# Patient Record
Sex: MALE | Race: WHITE | NOT HISPANIC OR LATINO | Employment: OTHER | URBAN - METROPOLITAN AREA
[De-identification: names, ages, dates, MRNs, and addresses within clinical notes are randomized per-mention and may not be internally consistent; named-entity substitution may affect disease eponyms.]

---

## 2023-12-10 ENCOUNTER — HOSPITAL ENCOUNTER (EMERGENCY)
Facility: HOSPITAL | Age: 88
Discharge: HOME | End: 2023-12-10
Attending: STUDENT IN AN ORGANIZED HEALTH CARE EDUCATION/TRAINING PROGRAM
Payer: MEDICARE

## 2023-12-10 ENCOUNTER — APPOINTMENT (OUTPATIENT)
Dept: RADIOLOGY | Facility: HOSPITAL | Age: 88
End: 2023-12-10
Payer: MEDICARE

## 2023-12-10 VITALS
BODY MASS INDEX: 27.83 KG/M2 | RESPIRATION RATE: 18 BRPM | HEART RATE: 90 BPM | SYSTOLIC BLOOD PRESSURE: 142 MMHG | HEIGHT: 73 IN | OXYGEN SATURATION: 94 % | TEMPERATURE: 99 F | DIASTOLIC BLOOD PRESSURE: 98 MMHG | WEIGHT: 210 LBS

## 2023-12-10 DIAGNOSIS — U07.1 COVID-19: Primary | ICD-10-CM

## 2023-12-10 LAB
FLUAV RNA RESP QL NAA+PROBE: NOT DETECTED
FLUBV RNA RESP QL NAA+PROBE: NOT DETECTED
RSV RNA RESP QL NAA+PROBE: NOT DETECTED
SARS-COV-2 RNA RESP QL NAA+PROBE: DETECTED

## 2023-12-10 PROCEDURE — 71045 X-RAY EXAM CHEST 1 VIEW: CPT | Performed by: RADIOLOGY

## 2023-12-10 PROCEDURE — 87634 RSV DNA/RNA AMP PROBE: CPT | Performed by: STUDENT IN AN ORGANIZED HEALTH CARE EDUCATION/TRAINING PROGRAM

## 2023-12-10 PROCEDURE — A9270 NON-COVERED ITEM OR SERVICE: HCPCS | Mod: GY | Performed by: STUDENT IN AN ORGANIZED HEALTH CARE EDUCATION/TRAINING PROGRAM

## 2023-12-10 PROCEDURE — 2500000002 HC RX 250 W HCPCS SELF ADMINISTERED DRUGS (ALT 637 FOR MEDICARE OP, ALT 636 FOR OP/ED): Performed by: STUDENT IN AN ORGANIZED HEALTH CARE EDUCATION/TRAINING PROGRAM

## 2023-12-10 PROCEDURE — 99283 EMERGENCY DEPT VISIT LOW MDM: CPT | Mod: 25 | Performed by: STUDENT IN AN ORGANIZED HEALTH CARE EDUCATION/TRAINING PROGRAM

## 2023-12-10 PROCEDURE — 2500000001 HC RX 250 WO HCPCS SELF ADMINISTERED DRUGS (ALT 637 FOR MEDICARE OP): Performed by: STUDENT IN AN ORGANIZED HEALTH CARE EDUCATION/TRAINING PROGRAM

## 2023-12-10 PROCEDURE — 71045 X-RAY EXAM CHEST 1 VIEW: CPT | Mod: FY

## 2023-12-10 PROCEDURE — 87631 RESP VIRUS 3-5 TARGETS: CPT | Performed by: STUDENT IN AN ORGANIZED HEALTH CARE EDUCATION/TRAINING PROGRAM

## 2023-12-10 RX ORDER — ALBUTEROL SULFATE 90 UG/1
2 AEROSOL, METERED RESPIRATORY (INHALATION) EVERY 4 HOURS PRN
Qty: 18 G | Refills: 0 | Status: SHIPPED | OUTPATIENT
Start: 2023-12-10 | End: 2024-01-09

## 2023-12-10 RX ORDER — BENZONATATE 100 MG/1
200 CAPSULE ORAL ONCE
Status: COMPLETED | OUTPATIENT
Start: 2023-12-10 | End: 2023-12-10

## 2023-12-10 RX ORDER — BENZONATATE 100 MG/1
100 CAPSULE ORAL EVERY 8 HOURS PRN
Qty: 21 CAPSULE | Refills: 0 | Status: SHIPPED | OUTPATIENT
Start: 2023-12-10 | End: 2023-12-17

## 2023-12-10 RX ORDER — IPRATROPIUM BROMIDE AND ALBUTEROL SULFATE 2.5; .5 MG/3ML; MG/3ML
3 SOLUTION RESPIRATORY (INHALATION) ONCE
Status: COMPLETED | OUTPATIENT
Start: 2023-12-10 | End: 2023-12-10

## 2023-12-10 RX ADMIN — BENZONATATE 200 MG: 100 CAPSULE ORAL at 03:48

## 2023-12-10 RX ADMIN — IPRATROPIUM BROMIDE AND ALBUTEROL SULFATE 3 ML: .5; 3 SOLUTION RESPIRATORY (INHALATION) at 03:48

## 2023-12-10 ASSESSMENT — LIFESTYLE VARIABLES
EVER HAD A DRINK FIRST THING IN THE MORNING TO STEADY YOUR NERVES TO GET RID OF A HANGOVER: NO
HAVE PEOPLE ANNOYED YOU BY CRITICIZING YOUR DRINKING: NO
EVER FELT BAD OR GUILTY ABOUT YOUR DRINKING: NO
HAVE YOU EVER FELT YOU SHOULD CUT DOWN ON YOUR DRINKING: NO

## 2023-12-10 ASSESSMENT — COLUMBIA-SUICIDE SEVERITY RATING SCALE - C-SSRS
2. HAVE YOU ACTUALLY HAD ANY THOUGHTS OF KILLING YOURSELF?: NO
6. HAVE YOU EVER DONE ANYTHING, STARTED TO DO ANYTHING, OR PREPARED TO DO ANYTHING TO END YOUR LIFE?: NO
1. IN THE PAST MONTH, HAVE YOU WISHED YOU WERE DEAD OR WISHED YOU COULD GO TO SLEEP AND NOT WAKE UP?: NO

## 2023-12-10 ASSESSMENT — PAIN - FUNCTIONAL ASSESSMENT: PAIN_FUNCTIONAL_ASSESSMENT: 0-10

## 2023-12-10 NOTE — ED PROVIDER NOTES
History/Exam limitations: none  HPI was provided by patient    HPI:    Chief Complaint   Patient presents with    Cough     Pt reports cough and HA that started yesterday. He reports being around sick individuals this past Wednesday. Denied CP, n/v/d.         Favian Hinson is a 89 y.o. male presents with chief complaint of upper respiratory complaints and feels his blood pressure is high.  Patient was around sick people earlier this week and now has a dry cough, sinus congestion and drainage.  This is a headache but states not the worst headache of his life and is worse with his cough.      ROS All other review of systems are negative except as noted above and HPI or   CONSTITUTIONAL: fever, chills  EYE: Pain, changes in vision  ENT: sore throat, congestion, rhinorrhea  CARDIOVASCULAR: chest pain, palpitations, swelling  RESPIRATORY: cough, wheeze, shortness of breath  GI: abdominal pain, nausea, vomiting, diarrhea  GENITOURINARY: dysuria, hematuria, frequency  MUSCULOSKELETAL: deformity, neck pain  SKIN: rash, color change  NEUROLOGIC: headache, numbness, focal weakness  NOTES: All systems reviewed, negative except as described above .    Physical exam  GENERAL: Alert, oriented , cooperative,  in no acute distress.  HEAD: normocephalic, atraumatic  SKIN: Intact,  dry skin, no lesions.  EYES: PERRL, EOMs intact,  Conjunctiva pink with no erythema or exudates. No scleral icterus.   ENT: No external deformities. Nares patent, mucus membranes moist.  Nasal congestion present bilateral pharynx clear, uvula midline.   NECK: Supple, without meningismus. Trachea at midline. No lymphadenopathy.  PULMONARY: Coarse breath sounds heard bilaterally with rhonchi. No crackles, wheezing.  No respiratory distress.  No work of breathing.  CARDIAC: Regular rate and rhythm.  Pulses 2+ and radials.  No murmur, rub.  ABDOMEN: Soft, nontender, active bowel sounds.  No palpable organomegaly.  No rebound or guarding.  No CVA tenderness.   No pulsatile masses.  MUSCULOSKELETAL: Full range of motion throughout, no deformity.   NEUROLOGICAL: No focal neuro deficits.  PSYCHIATRIC: Appropriate mood and affect. Calm.    No past medical history on file.   Social History     Socioeconomic History    Marital status:      Spouse name: Not on file    Number of children: Not on file    Years of education: Not on file    Highest education level: Not on file   Occupational History    Not on file   Tobacco Use    Smoking status: Not on file    Smokeless tobacco: Not on file   Substance and Sexual Activity    Alcohol use: Not on file    Drug use: Not on file    Sexual activity: Not on file   Other Topics Concern    Not on file   Social History Narrative    Not on file     Social Determinants of Health     Financial Resource Strain: Not on file   Food Insecurity: Not on file   Transportation Needs: Not on file   Physical Activity: Not on file   Stress: Not on file   Social Connections: Not on file   Intimate Partner Violence: Not on file   Housing Stability: Not on file     Current Outpatient Medications   Medication Instructions    albuterol 90 mcg/actuation inhaler 2 puffs, inhalation, Every 4 hours PRN    benzonatate (TESSALON) 100 mg, oral, Every 8 hours PRN, Do not crush or chew.     No Known Allergies               ED Triage Vitals   Temp Pulse Resp BP   -- -- -- --      SpO2 Temp src Heart Rate Source Patient Position   -- -- -- --      BP Location FiO2 (%)     -- --               Labs and Imaging  XR chest 1 view   Final Result   Stable cardiomegaly. No acute pulmonary process.        MACRO:   None        Signed by: Chidi Turpin 12/10/2023 3:57 AM   Dictation workstation:   [id number]        Labs Reviewed   SARS-COV-2 PCR, SYMPTOMATIC - Abnormal       Result Value    Coronavirus 2019, PCR Detected (*)     Narrative:     This assay has received FDA Emergency Use Authorization (EUA) and is only authorized for the duration of time that circumstances  exist to justify the authorization of the emergency use of in vitro diagnostic tests for the detection of SARS-CoV-2 virus and/or diagnosis of COVID-19 infection under section 564(b)(1) of the Act, 21 U.S.C. 360bbb-3(b)(1). This assay is an in vitro diagnostic nucleic acid amplification test for the qualitative detection of SARS-CoV-2 from nasopharyngeal specimens and has been validated for use at [facility]. Negative results do not preclude COVID-19 infections and should not be used as the sole basis for diagnosis, treatment, or other management decisions.     INFLUENZA A AND B PCR - Normal    Flu A Result Not Detected      Flu B Result Not Detected      Narrative:     This assay is an in vitro diagnostic multiplex nucleic acid amplification test for the detection and discrimination of Influenza A & B from nasopharyngeal specimens, and has been validated for use at [facility]. Negative results do not preclude Influenza A/B infections, and should not be used as the sole basis for diagnosis, treatment, or other management decisions. If Influenza A/B and RSV PCR results are negative, testing for Parainfluenza virus, Adenovirus and Metapneumovirus is routinely performed [place] [facility] [place], and is available on other patients by placing an add-on request.   RSV PCR - Normal    RSV PCR Not Detected      Narrative:     This assay is an FDA-cleared, in vitro diagnostic nucleic acid amplification test for the detection of RSV from nasopharyngeal specimens, and has been validated for use at [facility]. Negative results do not preclude RSV infections, and should not be used as the sole basis for diagnosis, treatment, or other management decisions. If Influenza A/B and RSV PCR results are negative, testing for Parainfluenza virus, Adenovirus and Metapneumovirus is routinely performed for pediatric oncology and intensive  care inpatients at [facility], and is available on other patients by placing an add-on request.             Medical Decision Making:   The patient presented for evaluation of respiratory complaints. Differential includes but not limited to pneumonia, viral illness, COVID-19, URI.     Patient is stable for discharge home and symptoms are likely secondary to a viral illness.  The patient is breathing in no acute distress with normal oxygen saturations on room air.        Imaging studies if performed were independently reviewed and interpreted by myself and confirmed by radiologist.        External Records Reviewed: I reviewed recent and relevant outside records    ED Course as of 12/10/23 0424   Sun Dec 10, 2023   0328 EKG interpreted by me shows A-fib but premature ventricular complexes.  Artifact present throughout his patient coughing during exam.  Rate 86.  Compared to prior EKG A-fib no new finding.  No STEMI. [WL]   0420 He was given Tessalon Perle and breathing treatment here and had resolution of his breathing symptoms and coarse breath sounds were clear on reevaluation.  Will give him albuterol inhaler and Tessalon Perles prescription to go home with and he will quarantine at home.  He will follow-up with his primary care doctor and treatment options if they are appropriate for him. [WL]      ED Course User Index  [WL] Josh De La Rosa, DO         Diagnoses as of 12/10/23 0424   COVID-19         XR chest 1 view   Final Result   Stable cardiomegaly. No acute pulmonary process.        MACRO:   None        Signed by: Chidi Turpin 12/10/2023 3:57 AM   Dictation workstation:   [id number]        Labs Reviewed   SARS-COV-2 PCR, SYMPTOMATIC - Abnormal       Result Value    Coronavirus 2019, PCR Detected (*)     Narrative:     This assay has received FDA Emergency Use Authorization (EUA) and is only authorized for the duration of time that circumstances exist to justify the authorization of the emergency use of in vitro  diagnostic tests for the detection of SARS-CoV-2 virus and/or diagnosis of COVID-19 infection under section 564(b)(1) of the Act, 21 U.S.C. 360bbb-3(b)(1). This assay is an in vitro diagnostic nucleic acid amplification test for the qualitative detection of SARS-CoV-2 from nasopharyngeal specimens and has been validated for use at [facility]. Negative results do not preclude COVID-19 infections and should not be used as the sole basis for diagnosis, treatment, or other management decisions.     INFLUENZA A AND B PCR - Normal    Flu A Result Not Detected      Flu B Result Not Detected      Narrative:     This assay is an in vitro diagnostic multiplex nucleic acid amplification test for the detection and discrimination of Influenza A & B from nasopharyngeal specimens, and has been validated for use at [facility]. Negative results do not preclude Influenza A/B infections, and should not be used as the sole basis for diagnosis, treatment, or other management decisions. If Influenza A/B and RSV PCR results are negative, testing for Parainfluenza virus, Adenovirus and Metapneumovirus is routinely performed [place] [facility] [place], and is available on other patients by placing an add-on request.   RSV PCR - Normal    RSV PCR Not Detected      Narrative:     This assay is an FDA-cleared, in vitro diagnostic nucleic acid amplification test for the detection of RSV from nasopharyngeal specimens, and has been validated for use at [facility]. Negative results do not preclude RSV infections, and should not be used as the sole basis for diagnosis, treatment, or other management decisions. If Influenza A/B and RSV PCR results are negative, testing for Parainfluenza virus, Adenovirus and Metapneumovirus is routinely performed for pediatric oncology and intensive care [place] [facility] [place] and is available on other patients by  placing an add-on request.               Procedure  Procedures       The patient  has stable vs and will be discharge home.   The patient and caregiver are in agreement with the plan and given instructions to follow up with their PCP.     I discussed the differential, results and plan with the patient and/or family/friend/caregiver if present.       I emphasized the importance of follow-up with the physician I referred them to in the timeframe recommended.  I explained reasons for the patient to return to the Emergency Department. Additional verbal discharge instructions were also given and discussed with the patient to supplement those generated by the EMR. We also discussed medications that were prescribed (if any) including common side effects and interactions. The patient was advised to abstain from driving, operating heavy machinery or making significant decisions while taking medications such as opiates and muscle relaxers that may impair this. All questions were addressed.  They understand return precautions and discharge instructions. The patient and/or family/friend/caregiver expressed understanding.     Note: This note was dictated by speech recognition. Minor errors in transcription may be present.           Josh De La Rosa, DO  12/10/23 0424